# Patient Record
Sex: FEMALE
[De-identification: names, ages, dates, MRNs, and addresses within clinical notes are randomized per-mention and may not be internally consistent; named-entity substitution may affect disease eponyms.]

---

## 2020-12-01 ENCOUNTER — APPOINTMENT (OUTPATIENT)
Dept: OTOLARYNGOLOGY | Facility: CLINIC | Age: 55
End: 2020-12-01
Payer: COMMERCIAL

## 2020-12-01 VITALS
SYSTOLIC BLOOD PRESSURE: 161 MMHG | DIASTOLIC BLOOD PRESSURE: 88 MMHG | BODY MASS INDEX: 34.84 KG/M2 | WEIGHT: 222 LBS | TEMPERATURE: 98 F | HEART RATE: 66 BPM | HEIGHT: 67 IN

## 2020-12-01 DIAGNOSIS — Z88.9 ALLERGY STATUS TO UNSPECIFIED DRUGS, MEDICAMENTS AND BIOLOGICAL SUBSTANCES: ICD-10-CM

## 2020-12-01 DIAGNOSIS — R42 DIZZINESS AND GIDDINESS: ICD-10-CM

## 2020-12-01 DIAGNOSIS — H81.10 BENIGN PAROXYSMAL VERTIGO, UNSPECIFIED EAR: ICD-10-CM

## 2020-12-01 DIAGNOSIS — Z86.79 PERSONAL HISTORY OF OTHER DISEASES OF THE CIRCULATORY SYSTEM: ICD-10-CM

## 2020-12-01 DIAGNOSIS — G43.809 OTHER MIGRAINE, NOT INTRACTABLE, W/OUT STATUS MIGRAINOSUS: ICD-10-CM

## 2020-12-01 DIAGNOSIS — H90.3 SENSORINEURAL HEARING LOSS, BILATERAL: ICD-10-CM

## 2020-12-01 DIAGNOSIS — Z83.3 FAMILY HISTORY OF DIABETES MELLITUS: ICD-10-CM

## 2020-12-01 DIAGNOSIS — Z87.891 PERSONAL HISTORY OF NICOTINE DEPENDENCE: ICD-10-CM

## 2020-12-01 PROBLEM — Z00.00 ENCOUNTER FOR PREVENTIVE HEALTH EXAMINATION: Status: ACTIVE | Noted: 2020-12-01

## 2020-12-01 PROCEDURE — 99072 ADDL SUPL MATRL&STAF TM PHE: CPT

## 2020-12-01 PROCEDURE — 92567 TYMPANOMETRY: CPT

## 2020-12-01 PROCEDURE — 92557 COMPREHENSIVE HEARING TEST: CPT

## 2020-12-01 PROCEDURE — 99204 OFFICE O/P NEW MOD 45 MIN: CPT

## 2020-12-01 RX ORDER — OLMESARTAN MEDOXOMIL 40 MG/1
40 TABLET, FILM COATED ORAL
Refills: 0 | Status: ACTIVE | COMMUNITY

## 2020-12-01 NOTE — ASSESSMENT
[FreeTextEntry1] : Patient with likely bppv - can not completely r/o vestibular migraine - recommended vestibular exercises and follow up in 2 mos . If problems persist would consider vestibular therapy.

## 2020-12-01 NOTE — HISTORY OF PRESENT ILLNESS
[de-identified] : Problem started 10 days ago.  Treated with bonine (no meclizine) - also received medrol doseak from primary care.  Problem started suddenly.   No spinning - has had problem with balance.  Problems turning in bed.  Worse turning to left.  - lasts several seconds and stops.   No blackout , no diplopia.  Feels ears clogged.   No prior ear problems except similar episode about one year ago.   \par Occ headaches and does have hx of migraines.

## 2020-12-01 NOTE — REVIEW OF SYSTEMS
[Sneezing] : sneezing [Seasonal Allergies] : seasonal allergies [Ear Pain] : ear pain [Ear Itch] : ear itch [Dizziness] : dizziness [Vertigo] : vertigo [Ear Drainage] : ear drainage [Ear Noises] : ear noises [Sinus Pain] : sinus pain [Sinus Pressure] : sinus pressure [Negative] : Heme/Lymph [FreeTextEntry1] : headache   watery eyes  sweating at night

## 2022-08-23 ENCOUNTER — APPOINTMENT (OUTPATIENT)
Dept: ORTHOPEDIC SURGERY | Facility: CLINIC | Age: 57
End: 2022-08-23

## 2022-08-23 VITALS — WEIGHT: 220 LBS | BODY MASS INDEX: 34.53 KG/M2 | HEIGHT: 67 IN

## 2022-08-23 DIAGNOSIS — I10 ESSENTIAL (PRIMARY) HYPERTENSION: ICD-10-CM

## 2022-08-23 DIAGNOSIS — M94.261 CHONDROMALACIA, RIGHT KNEE: ICD-10-CM

## 2022-08-23 PROCEDURE — 99204 OFFICE O/P NEW MOD 45 MIN: CPT

## 2022-08-23 PROCEDURE — 73562 X-RAY EXAM OF KNEE 3: CPT | Mod: RT

## 2022-08-23 NOTE — PHYSICAL EXAM
[Right] : right knee [5___] : hamstring 5[unfilled]/5 [] : no extensor lag [TWNoteComboBox7] : flexion 110 degrees [de-identified] : extension 0 degrees

## 2022-08-23 NOTE — HISTORY OF PRESENT ILLNESS
[de-identified] : Date of Injury/Onset:      4 years ago\par Pain: At Rest: 0  /10   \par With Activity: 8 /10 \par Affecting Sleep: NO\par Difficulty with stairs: YES\par Difficulty getting in and out of car:YES\par Sit to stand stiffness:YES\par Mechanism of injury:  [employer], STARTING HURTING AFTER BOWLING\par This  is not a Work Related Injury being treated under Worker's Compensation.\par This  is not   an athletic injury occurring associated with an interscholastic or organized sports team.\par Quality of symptoms: C/O MEDIAL SIDED THROBBING PAIN, C/O CANT GET UP, \par Improves with:   SITTING \par Worse with:    SIDE STEPS OR TWISTING\par Previous Treatment/Imaging/Studies Since Last Visit: HAD A CHATO INJECTION, PT, TYLENOL FOR PAIN, DEEP SANDER LINAMENT, BRACE, HAS SEEN 3 ORTHOS FOR PROBLEM\par Reports Available For Review Today: NONE\par \par  \par \par

## 2022-08-23 NOTE — DISCUSSION/SUMMARY
[de-identified] :  Lengthy discussion regarding options was had with the patient. Nonsurgical options including but not limited to cortisone,viscosupplementation, anti-inflammatory medications, activity modification,  non impact exercise /, weight loss , maintaining a healthy BMI,bracing, and icing were reviewed. Surgical options including but not limited to arthroscopy,  t were discussed as was risks, benefits and alternatives. All questions were answered. \par \par PT Rx provided

## 2024-02-12 ENCOUNTER — APPOINTMENT (OUTPATIENT)
Dept: ORTHOPEDIC SURGERY | Facility: CLINIC | Age: 59
End: 2024-02-12